# Patient Record
Sex: FEMALE | Race: WHITE | NOT HISPANIC OR LATINO
[De-identification: names, ages, dates, MRNs, and addresses within clinical notes are randomized per-mention and may not be internally consistent; named-entity substitution may affect disease eponyms.]

---

## 2018-02-13 PROBLEM — Z00.00 ENCOUNTER FOR PREVENTIVE HEALTH EXAMINATION: Status: ACTIVE | Noted: 2018-02-13

## 2018-02-16 ENCOUNTER — APPOINTMENT (OUTPATIENT)
Dept: HEMATOLOGY ONCOLOGY | Facility: CLINIC | Age: 83
End: 2018-02-16

## 2018-05-01 ENCOUNTER — APPOINTMENT (OUTPATIENT)
Dept: CARDIOLOGY | Facility: CLINIC | Age: 83
End: 2018-05-01

## 2018-05-04 ENCOUNTER — APPOINTMENT (OUTPATIENT)
Dept: CARDIOLOGY | Facility: CLINIC | Age: 83
End: 2018-05-04

## 2018-05-04 VITALS
WEIGHT: 108 LBS | HEART RATE: 66 BPM | DIASTOLIC BLOOD PRESSURE: 62 MMHG | HEIGHT: 55 IN | SYSTOLIC BLOOD PRESSURE: 163 MMHG | TEMPERATURE: 97.8 F | OXYGEN SATURATION: 99 % | BODY MASS INDEX: 24.99 KG/M2

## 2018-05-04 DIAGNOSIS — Z82.0 FAMILY HISTORY OF EPILEPSY AND OTHER DISEASES OF THE NERVOUS SYSTEM: ICD-10-CM

## 2018-05-04 DIAGNOSIS — Z85.79 PERSONAL HISTORY OF OTHER MALIGNANT NEOPLASMS OF LYMPHOID, HEMATOPOIETIC AND RELATED TISSUES: ICD-10-CM

## 2018-05-04 DIAGNOSIS — Z82.49 FAMILY HISTORY OF ISCHEMIC HEART DISEASE AND OTHER DISEASES OF THE CIRCULATORY SYSTEM: ICD-10-CM

## 2018-05-04 DIAGNOSIS — Z87.09 PERSONAL HISTORY OF OTHER DISEASES OF THE RESPIRATORY SYSTEM: ICD-10-CM

## 2018-05-04 DIAGNOSIS — Z87.19 PERSONAL HISTORY OF OTHER DISEASES OF THE DIGESTIVE SYSTEM: ICD-10-CM

## 2018-05-04 DIAGNOSIS — Z87.2 PERSONAL HISTORY OF DISEASES OF THE SKIN AND SUBCUTANEOUS TISSUE: ICD-10-CM

## 2018-05-04 DIAGNOSIS — Z85.3 PERSONAL HISTORY OF MALIGNANT NEOPLASM OF BREAST: ICD-10-CM

## 2018-05-04 DIAGNOSIS — Z87.01 PERSONAL HISTORY OF PNEUMONIA (RECURRENT): ICD-10-CM

## 2018-05-04 DIAGNOSIS — Z87.898 PERSONAL HISTORY OF OTHER SPECIFIED CONDITIONS: ICD-10-CM

## 2018-05-04 DIAGNOSIS — Z86.2 PERSONAL HISTORY OF DISEASES OF THE BLOOD AND BLOOD-FORMING ORGANS AND CERTAIN DISORDERS INVOLVING THE IMMUNE MECHANISM: ICD-10-CM

## 2018-05-04 DIAGNOSIS — Z86.718 PERSONAL HISTORY OF OTHER VENOUS THROMBOSIS AND EMBOLISM: ICD-10-CM

## 2018-05-23 PROBLEM — Z85.3 HISTORY OF MALIGNANT NEOPLASM OF BREAST: Status: RESOLVED | Noted: 2018-05-23 | Resolved: 2018-05-23

## 2018-05-23 PROBLEM — Z87.19 HISTORY OF HIATAL HERNIA: Status: RESOLVED | Noted: 2018-05-23 | Resolved: 2018-05-23

## 2018-05-23 PROBLEM — Z86.718 HISTORY OF DEEP VENOUS THROMBOSIS: Status: RESOLVED | Noted: 2018-05-23 | Resolved: 2018-05-23

## 2018-05-23 PROBLEM — Z87.09 HISTORY OF VOCAL CORD PARALYSIS: Status: RESOLVED | Noted: 2018-05-23 | Resolved: 2018-05-23

## 2018-05-23 PROBLEM — Z87.01 HISTORY OF PNEUMONIA: Status: RESOLVED | Noted: 2018-05-23 | Resolved: 2018-05-23

## 2018-05-23 PROBLEM — Z82.49 FAMILY HISTORY OF ACUTE MYOCARDIAL INFARCTION: Status: ACTIVE | Noted: 2018-05-23

## 2018-05-23 PROBLEM — Z87.2 HISTORY OF CELLULITIS: Status: RESOLVED | Noted: 2018-05-23 | Resolved: 2018-05-23

## 2018-05-23 PROBLEM — Z87.898 HISTORY OF SYNCOPE: Status: RESOLVED | Noted: 2018-05-23 | Resolved: 2018-05-23

## 2018-05-23 PROBLEM — Z82.0 FAMILY HISTORY OF CEREBRAL HEMORRHAGE: Status: ACTIVE | Noted: 2018-05-23

## 2018-05-23 PROBLEM — Z87.09 HISTORY OF RESTRICTIVE LUNG DISEASE: Status: RESOLVED | Noted: 2018-05-23 | Resolved: 2018-05-23

## 2018-05-23 PROBLEM — Z87.19 HISTORY OF DIVERTICULITIS OF COLON: Status: RESOLVED | Noted: 2018-05-23 | Resolved: 2018-05-23

## 2018-05-23 PROBLEM — Z86.2 HISTORY OF MYELODYSPLASTIC SYNDROME: Status: RESOLVED | Noted: 2018-05-23 | Resolved: 2018-05-23

## 2018-05-23 PROBLEM — Z85.79 HISTORY OF LYMPHOMA: Status: RESOLVED | Noted: 2018-05-23 | Resolved: 2018-05-23

## 2018-05-23 PROBLEM — Z82.49 FAMILY HISTORY OF CORONARY ARTERY DISEASE: Status: ACTIVE | Noted: 2018-05-23

## 2018-05-23 RX ORDER — CHOLECALCIFEROL (VITAMIN D3) 25 MCG
TABLET ORAL
Refills: 0 | Status: ACTIVE | COMMUNITY

## 2018-11-02 ENCOUNTER — RECORD ABSTRACTING (OUTPATIENT)
Age: 83
End: 2018-11-02

## 2019-01-07 ENCOUNTER — APPOINTMENT (OUTPATIENT)
Dept: CARDIOLOGY | Facility: CLINIC | Age: 84
End: 2019-01-07
Payer: MEDICARE

## 2019-01-07 VITALS
BODY MASS INDEX: 23.47 KG/M2 | WEIGHT: 101 LBS | HEART RATE: 62 BPM | SYSTOLIC BLOOD PRESSURE: 130 MMHG | DIASTOLIC BLOOD PRESSURE: 78 MMHG | OXYGEN SATURATION: 100 %

## 2019-01-07 DIAGNOSIS — Z87.39 PERSONAL HISTORY OF OTHER DISEASES OF THE MUSCULOSKELETAL SYSTEM AND CONNECTIVE TISSUE: ICD-10-CM

## 2019-01-07 PROCEDURE — 99214 OFFICE O/P EST MOD 30 MIN: CPT

## 2019-01-07 PROCEDURE — 93000 ELECTROCARDIOGRAM COMPLETE: CPT

## 2019-01-11 ENCOUNTER — NON-APPOINTMENT (OUTPATIENT)
Age: 84
End: 2019-01-11

## 2019-01-11 PROBLEM — Z87.39 HISTORY OF KYPHOSCOLIOSIS: Status: RESOLVED | Noted: 2019-01-11 | Resolved: 2019-01-11

## 2019-01-11 NOTE — REVIEW OF SYSTEMS
[Feeling Fatigued] : feeling fatigued [Eyeglasses] : currently wearing eyeglasses [see HPI] : see HPI [Shortness Of Breath] : shortness of breath [Joint Pain] : joint pain [Negative] : Heme/Lymph

## 2019-01-11 NOTE — DISCUSSION/SUMMARY
[FreeTextEntry1] : Atrial fibrillation\par The working diagnosis is paroxysmal  atrial fibrillation secondary to hypertensive- valvular (mild-moderate mitral regurgitation 3/18 echocardiogram) and (probable) atherosclerotic heart disease. Adequate rate control of the ventricular response is  achieved with the present medical regimen. An elevated "[id number]" score is indicative of an increased risk of systemic/cerebral emboli. Vitamin K antagonists therapy is indicated with a target INR of 2-3.\par \par I have recommended the following:\par 1. Continue present medical regimen\par 2. Target INR 2-3 as discussed above\par 3. 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 The history is consistent with this diagnosis. The differential diagnosis would include poorly venous congestion secondary to diastolic dysfunction. However the 3/18 chest x-ray and physical examination are not suggestive of this diagnosis.\par \par I recommended the following:\par 1. Consideration for pulmonary function studies if not recently performed.\par \par \par Valvular heart disease\par The 3/18 echocardiogram demonstrated marked calcification the mitral valve annulus with thickened leaflets. An echo density was seen somewhat mobile near the annulus and posterior leaflet. The possibility of a vegetation could not be ruled out. Mild-moderate mitral regurgitation was noted. The left atrium was enlarged at 4.9 cm. The left ventricular ejection fraction was 61%. The present cardiac physical examination is not suggestive of severe mitral regurgitation. In view of the absence of heart failure and clinical course continued monitoring without intervention is indicated at this time.\par \par I have recommended the following:\par 1. No further cardiac testing for this problem at this time.\par \par \par Hypertension\par Hypertension is controlled on the present medical regimen. In the setting of atrial fibrillation and probable atherosclerotic  heart disease beta blocker and ACE-I./ARB therapy are attractive. The dose of quinapril may be increased if required to maintain optimum levels.\par \par I have recommended the following:\par 1. Low salt low fat diet. Exercise to the extent possible\par 2 continue the present medical regimen\par 3 increase quinapril dose of required to maintain optimum levels as discussed above.

## 2019-01-11 NOTE — HISTORY OF PRESENT ILLNESS
[FreeTextEntry1] : 89-year-old female\par Routine followup\par "I feel okay." Lorene denies symptoms of chest pain, palpitations or syncope. Dyspnea on exertion is unchanged from in the past. Mrs. Whitlock complains primarily of back pain.\par \par Lorene is accompanied today by her daughter

## 2019-07-08 ENCOUNTER — APPOINTMENT (OUTPATIENT)
Dept: CARDIOLOGY | Facility: CLINIC | Age: 84
End: 2019-07-08

## 2019-07-22 ENCOUNTER — APPOINTMENT (OUTPATIENT)
Dept: INTERNAL MEDICINE | Facility: CLINIC | Age: 84
End: 2019-07-22
Payer: MEDICARE

## 2019-07-22 VITALS
BODY MASS INDEX: 23.14 KG/M2 | SYSTOLIC BLOOD PRESSURE: 138 MMHG | HEIGHT: 55 IN | DIASTOLIC BLOOD PRESSURE: 66 MMHG | WEIGHT: 100 LBS

## 2019-07-22 DIAGNOSIS — Z87.898 PERSONAL HISTORY OF OTHER SPECIFIED CONDITIONS: ICD-10-CM

## 2019-07-22 DIAGNOSIS — Z86.79 PERSONAL HISTORY OF OTHER DISEASES OF THE CIRCULATORY SYSTEM: ICD-10-CM

## 2019-07-22 LAB
INR PPP: 2.8 RATIO
POCT-PROTHROMBIN TIME: 33.8 SECS

## 2019-07-22 PROCEDURE — 99204 OFFICE O/P NEW MOD 45 MIN: CPT

## 2019-07-22 PROCEDURE — 85610 PROTHROMBIN TIME: CPT | Mod: QW

## 2019-07-22 RX ORDER — WARFARIN 4 MG/1
TABLET ORAL
Refills: 0 | Status: DISCONTINUED | COMMUNITY
End: 2019-07-22

## 2019-07-22 NOTE — HEALTH RISK ASSESSMENT
[Good] : ~his/her~ current health as good [No] : In the past 12 months have you used drugs other than those required for medical reasons? No [Never (0 pts)] : Never (0 points) [No falls in past year] : Patient reported no falls in the past year [0] : 2) Feeling down, depressed, or hopeless: Not at all (0) [Hepatitis C test declined] : Hepatitis C test declined [HIV test declined] : HIV test declined [] :  [Feels Safe at Home] : Feels safe at home [Independent] : managing medications [Some assistance needed] : managing finances [Patient declined mammogram] : Patient declined mammogram [Patient declined PAP Smear] : Patient declined PAP Smear [Patient reported bone density results were abnormal] : Patient reported bone density results were abnormal [Patient declined colonoscopy] : Patient declined colonoscopy [None] : None [Alone] : lives alone [Retired] : retired [] : No [de-identified] : none [de-identified] : none [PapSmearComments] : no longer gets [BoneDensityDate] : 02/13 [MammogramComments] : no longer gets [de-identified] : has an aide and her family close by [BoneDensityComments] : osteoporosis [ColonoscopyComments] : no longer gets

## 2019-07-22 NOTE — PHYSICAL EXAM
[No Acute Distress] : no acute distress [Well Nourished] : well nourished [Well Developed] : well developed [Well-Appearing] : well-appearing [Normal Oropharynx] : the oropharynx was normal [No JVD] : no jugular venous distention [Supple] : supple [Normal] : no respiratory distress, lungs were clear to auscultation bilaterally and no accessory muscle use [Normal Affect] : the affect was normal [Normal Insight/Judgement] : insight and judgment were intact [Normal Rate] : normal rate  [Normal S1, S2] : normal S1 and S2 [Kyphosis] : kyphosis [No Rash] : no rash [No Focal Deficits] : no focal deficits [Normal Mood] : the mood was normal [de-identified] : LLE edema around ankle [de-identified] : irreg, ? diastolic murmur I/VI [de-identified] : walks with walker [de-identified] : many moles and skin lesions

## 2019-07-22 NOTE — HISTORY OF PRESENT ILLNESS
[FreeTextEntry1] : establish care\par follow up chronic conditions [de-identified] : Pt here for first time visit. She feels well today. She lives at home alone with an aide from 8AM- 12PM who helps her with bathing and house hold maintenance. . She walks with a walker when out of the house. She no longer drives. Her daughter lives close to her and does her grocery shopping. Pt does her own meal prep. She prepares easy microwavable meals.\par Pt with no specific complaints today.\par She needs to have INR checked. She used to follow with Dr Oral Ribeiro once monthly for INR check.

## 2019-07-22 NOTE — REVIEW OF SYSTEMS
[Hearing Loss] : hearing loss [Lower Ext Edema] : lower extremity edema [Negative] : Respiratory [Chest Pain] : no chest pain [Sore Throat] : no sore throat [Fever] : no fever [Easy Bleeding] : no easy bleeding [Easy Bruising] : no easy bruising [de-identified] : intermittent aches

## 2019-07-23 RX ORDER — ALENDRONATE SODIUM 70 MG/1
70 TABLET ORAL
Refills: 0 | Status: DISCONTINUED | COMMUNITY
End: 2019-07-23

## 2019-08-01 ENCOUNTER — APPOINTMENT (OUTPATIENT)
Dept: CARDIOLOGY | Facility: CLINIC | Age: 84
End: 2019-08-01

## 2019-08-09 ENCOUNTER — RX RENEWAL (OUTPATIENT)
Age: 84
End: 2019-08-09

## 2019-08-12 ENCOUNTER — RX RENEWAL (OUTPATIENT)
Age: 84
End: 2019-08-12

## 2019-08-13 ENCOUNTER — RX RENEWAL (OUTPATIENT)
Age: 84
End: 2019-08-13

## 2019-08-19 ENCOUNTER — RX RENEWAL (OUTPATIENT)
Age: 84
End: 2019-08-19

## 2019-08-26 ENCOUNTER — APPOINTMENT (OUTPATIENT)
Dept: CARDIOLOGY | Facility: CLINIC | Age: 84
End: 2019-08-26
Payer: MEDICARE

## 2019-08-26 ENCOUNTER — NON-APPOINTMENT (OUTPATIENT)
Age: 84
End: 2019-08-26

## 2019-08-26 VITALS
HEART RATE: 54 BPM | BODY MASS INDEX: 22.78 KG/M2 | OXYGEN SATURATION: 100 % | WEIGHT: 98 LBS | SYSTOLIC BLOOD PRESSURE: 160 MMHG | DIASTOLIC BLOOD PRESSURE: 84 MMHG

## 2019-08-26 LAB — INR PPP: 3.6 RATIO

## 2019-08-26 PROCEDURE — 85610 PROTHROMBIN TIME: CPT | Mod: QW

## 2019-08-26 PROCEDURE — 93000 ELECTROCARDIOGRAM COMPLETE: CPT

## 2019-08-26 PROCEDURE — 99215 OFFICE O/P EST HI 40 MIN: CPT

## 2019-08-27 NOTE — DISCUSSION/SUMMARY
[FreeTextEntry1] : Atrial fibrillation\par The working diagnosis is chronic  atrial fibrillation secondary to hypertensive- valvular (mild-moderate mitral regurgitation 3/18 echocardiogram) and (probable) atherosclerotic heart disease. Adequate rate control of the ventricular response is  achieved with the present medical regimen. An elevated "[id number]" score is indicative of an increased risk of systemic/cerebral emboli. Vitamin K antagonists therapy is indicated with a target INR of 2-3.\par \par I have recommended the following:\par 1. Continue present medical regimen\par 2. Target INR 2-3 as discussed above\par 3. 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 The history is consistent with this diagnosis. The differential diagnosis would include pulmonary  venous congestion secondary to diastolic dysfunction. However the 3/18 chest x-ray and physical examination are not suggestive of this diagnosis.\par \par I  have recommended the following:\par 1. Consideration for pulmonary function studies if not recently performed.\par \par \par Valvular heart disease\par The 3/18 echocardiogram demonstrated marked calcification the mitral valve annulus with thickened leaflets. An echo density was seen somewhat mobile near the annulus and posterior leaflet. The possibility of a vegetation could not be ruled out. Mild-moderate mitral regurgitation was noted. The left atrium was enlarged at 4.9 cm. The left ventricular ejection fraction was 61%. The present cardiac physical examination is not suggestive of severe mitral regurgitation. In view of the absence of heart failure and clinical course continued monitoring without intervention is indicated at this time.\par \par I have recommended the following:\par 1. No further cardiac testing for this problem at this time.\par \par \par \par \par Hypertension\par Hypertension is controlled on the present medical regimen. In the setting of atrial fibrillation and probable atherosclerotic  heart disease beta blocker and ACE-I./ARB therapy are attractive. The dose of quinapril may be increased if required to maintain optimum levels.\par \par I have recommended the following:\par 1. Low salt low fat diet. Exercise to the extent possible\par 2 continue the present medical regimen\par 3 increase quinapril dose of required to maintain optimum levels as discussed above.\par \par \par \par \par Hyperlipidemia\par Hyperlipidemia represents a risk factor for the development of atherosclerotic heart disease. Although likely to be present, there is no objective evidence of such to date. An 8/05 Persantine sestamibi study was normal with an ejection fraction of 71%. The target LDL level for primary prevention is about 100. Medical intervention in this patient's age group is controversial. The most recent lipid levels are not available for review. The dose of atorvastatin may be increased if required to obtain optimal levels. Nonpharmacological therapy, specifically diet and exercise to the extent possible are emphasized as  major aspects of treatment.\par \par I have recommended the following:\par 1. Low-salt low-fat diet. Exercise to the extent possible\par 2 continued present medical regimen\par 3. Laboratory studies including lipid profile through primary care Dr. Kerns\par 4. Target LDL level to about 100 as discussed above\par 5. Increase atorvastatin dosage required to obtain optimum levels as noted above.

## 2019-08-27 NOTE — PHYSICAL EXAM
[Normal Conjunctiva] : the conjunctiva exhibited no abnormalities [Auscultation Breath Sounds / Voice Sounds] : lungs were clear to auscultation bilaterally [Heart Sounds] : normal S1 and S2 [Bowel Sounds] : normal bowel sounds [Abdomen Soft] : soft [Abdomen Tenderness] : non-tender [Abnormal Walk] : normal gait [Cyanosis, Localized] : no localized cyanosis [] : no rash [Affect] : the affect was normal [FreeTextEntry1] : pleasant

## 2019-08-27 NOTE — HISTORY OF PRESENT ILLNESS
[FreeTextEntry1] : 89-year-old female\par Routine followup\par "I feel okay."  Mrs. Whitlock denies chest pain, palpitations, shortness of breath at rest or syncope.\par Dyspnea on exertion is unchanged from in the past.

## 2019-09-05 ENCOUNTER — RX RENEWAL (OUTPATIENT)
Age: 84
End: 2019-09-05

## 2019-09-19 ENCOUNTER — APPOINTMENT (OUTPATIENT)
Dept: INTERNAL MEDICINE | Facility: CLINIC | Age: 84
End: 2019-09-19
Payer: MEDICARE

## 2019-09-19 VITALS
BODY MASS INDEX: 22.68 KG/M2 | SYSTOLIC BLOOD PRESSURE: 124 MMHG | DIASTOLIC BLOOD PRESSURE: 68 MMHG | WEIGHT: 98 LBS | HEIGHT: 55 IN

## 2019-09-19 LAB — POCT-PROTHROMBIN TIME: 5.3 SECS

## 2019-09-19 PROCEDURE — 85610 PROTHROMBIN TIME: CPT | Mod: QW

## 2019-09-19 PROCEDURE — 99213 OFFICE O/P EST LOW 20 MIN: CPT

## 2019-09-19 NOTE — PHYSICAL EXAM
[Normal] : no respiratory distress, lungs were clear to auscultation bilaterally and no accessory muscle use [Normal Rate] : normal rate  [Normal Affect] : the affect was normal [Alert and Oriented x3] : oriented to person, place, and time [Normal Insight/Judgement] : insight and judgment were intact [de-identified] : irreg, possible soft systolic murmur only heard when she holds her breath.

## 2019-09-23 ENCOUNTER — APPOINTMENT (OUTPATIENT)
Dept: INTERNAL MEDICINE | Facility: CLINIC | Age: 84
End: 2019-09-23
Payer: MEDICARE

## 2019-09-25 ENCOUNTER — RX RENEWAL (OUTPATIENT)
Age: 84
End: 2019-09-25

## 2019-09-25 ENCOUNTER — APPOINTMENT (OUTPATIENT)
Dept: INTERNAL MEDICINE | Facility: CLINIC | Age: 84
End: 2019-09-25
Payer: MEDICARE

## 2019-09-25 VITALS
BODY MASS INDEX: 22.68 KG/M2 | WEIGHT: 98 LBS | SYSTOLIC BLOOD PRESSURE: 132 MMHG | DIASTOLIC BLOOD PRESSURE: 70 MMHG | HEIGHT: 55 IN

## 2019-09-25 LAB — POCT-PROTHROMBIN TIME: 5.8 SECS

## 2019-09-25 PROCEDURE — 99213 OFFICE O/P EST LOW 20 MIN: CPT

## 2019-09-25 PROCEDURE — 85610 PROTHROMBIN TIME: CPT | Mod: QW

## 2019-09-25 NOTE — REVIEW OF SYSTEMS
[Easy Bruising] : easy bruising [Negative] : Respiratory [Fever] : no fever [Easy Bleeding] : no easy bleeding

## 2019-09-25 NOTE — HEALTH RISK ASSESSMENT
[No] : In the past 12 months have you used drugs other than those required for medical reasons? No [No falls in past year] : Patient reported no falls in the past year [Assistive Device] : Patient uses an assistive device [0] : 2) Feeling down, depressed, or hopeless: Not at all (0) [] : No [de-identified] : none [de-identified] : none [de-identified] : luis carlos

## 2019-09-25 NOTE — PHYSICAL EXAM
[EOMI] : extraocular movements intact [Normal] : no respiratory distress, lungs were clear to auscultation bilaterally and no accessory muscle use [Normal Rate] : normal rate  [Normal Affect] : the affect was normal [Alert and Oriented x3] : oriented to person, place, and time [Normal Insight/Judgement] : insight and judgment were intact [de-identified] : mild injection sclera left eye [de-identified] : irreg, Soft systolic murmur [de-identified] : canker sore on hard palate [de-identified] : very thin skin on forearms

## 2019-09-25 NOTE — HISTORY OF PRESENT ILLNESS
[Formal Caregiver] : formal caregiver [FreeTextEntry1] : INR  [de-identified] : Pt was here last week 9/19/19 and her INR was high. She held the coumadin for 2 days and then restarted at her usual dose of 2.5mg every day. She is once again high. \par No bleeding or new bruising. \par Pt says she woke up with her left eye irritated. It is slightly injected here.\par Also has a canker sore on the roof of her mouth.

## 2019-09-30 ENCOUNTER — APPOINTMENT (OUTPATIENT)
Dept: INTERNAL MEDICINE | Facility: CLINIC | Age: 84
End: 2019-09-30
Payer: MEDICARE

## 2019-09-30 LAB — POCT-PROTHROMBIN TIME: 2.2 SECS

## 2019-09-30 PROCEDURE — 85610 PROTHROMBIN TIME: CPT | Mod: QW

## 2019-10-09 ENCOUNTER — RX RENEWAL (OUTPATIENT)
Age: 84
End: 2019-10-09

## 2019-10-14 ENCOUNTER — LABORATORY RESULT (OUTPATIENT)
Age: 84
End: 2019-10-14

## 2019-10-14 ENCOUNTER — APPOINTMENT (OUTPATIENT)
Dept: INTERNAL MEDICINE | Facility: CLINIC | Age: 84
End: 2019-10-14
Payer: MEDICARE

## 2019-10-14 VITALS
DIASTOLIC BLOOD PRESSURE: 70 MMHG | SYSTOLIC BLOOD PRESSURE: 132 MMHG | WEIGHT: 98 LBS | BODY MASS INDEX: 22.68 KG/M2 | HEIGHT: 55 IN

## 2019-10-14 DIAGNOSIS — K12.0 RECURRENT ORAL APHTHAE: ICD-10-CM

## 2019-10-14 DIAGNOSIS — H57.89 OTHER SPECIFIED DISORDERS OF EYE AND ADNEXA: ICD-10-CM

## 2019-10-14 LAB — POCT-PROTHROMBIN TIME: 1.9 SECS

## 2019-10-14 PROCEDURE — 99214 OFFICE O/P EST MOD 30 MIN: CPT | Mod: 25

## 2019-10-14 PROCEDURE — 85610 PROTHROMBIN TIME: CPT | Mod: QW

## 2019-10-14 PROCEDURE — 36415 COLL VENOUS BLD VENIPUNCTURE: CPT

## 2019-10-14 NOTE — PHYSICAL EXAM
[No Acute Distress] : no acute distress [Normal] : no respiratory distress, lungs were clear to auscultation bilaterally and no accessory muscle use [Normal Rate] : normal rate  [Normal S1, S2] : normal S1 and S2 [Coordination Grossly Intact] : coordination grossly intact [Alert and Oriented x3] : oriented to person, place, and time [Normal Affect] : the affect was normal [Normal Insight/Judgement] : insight and judgment were intact [de-identified] : many moles and skin lesions [de-identified] : irreg [de-identified] : walks with walker

## 2019-10-14 NOTE — HISTORY OF PRESENT ILLNESS
[Formal Caregiver] : formal caregiver [FreeTextEntry1] : check INR,\par follow up visit for chronic conditions\par  [de-identified] : Pt here for f/u visit. She feels well overall. No bleeding for unusual bruising. No chest pains or SOB. Her INR has been supratherapeutic on 2.5mg coumadin. She has been taking 2mg daily since last visit. \par She is here today with her aide and seen walking with her walker.

## 2019-10-15 LAB
ALBUMIN SERPL ELPH-MCNC: 3.4 G/DL
ALP BLD-CCNC: 74 U/L
ALT SERPL-CCNC: 7 U/L
ANION GAP SERPL CALC-SCNC: 11 MMOL/L
AST SERPL-CCNC: 16 U/L
BASOPHILS # BLD AUTO: 0.02 K/UL
BASOPHILS NFR BLD AUTO: 1.2 %
BILIRUB SERPL-MCNC: 0.4 MG/DL
BUN SERPL-MCNC: 27 MG/DL
CALCIUM SERPL-MCNC: 9.3 MG/DL
CHLORIDE SERPL-SCNC: 103 MMOL/L
CHOLEST SERPL-MCNC: 112 MG/DL
CHOLEST/HDLC SERPL: 2 RATIO
CO2 SERPL-SCNC: 24 MMOL/L
CREAT SERPL-MCNC: 1.45 MG/DL
EOSINOPHIL # BLD AUTO: 0.06 K/UL
EOSINOPHIL NFR BLD AUTO: 3.5 %
GLUCOSE SERPL-MCNC: 77 MG/DL
HCT VFR BLD CALC: 32.2 %
HDLC SERPL-MCNC: 57 MG/DL
HGB BLD-MCNC: 9.8 G/DL
IMM GRANULOCYTES NFR BLD AUTO: 0.6 %
LDLC SERPL CALC-MCNC: 44 MG/DL
LYMPHOCYTES # BLD AUTO: 1.06 K/UL
LYMPHOCYTES NFR BLD AUTO: 62.4 %
MAN DIFF?: NORMAL
MCHC RBC-ENTMCNC: 28.2 PG
MCHC RBC-ENTMCNC: 30.4 GM/DL
MCV RBC AUTO: 92.8 FL
MONOCYTES # BLD AUTO: 0.23 K/UL
MONOCYTES NFR BLD AUTO: 13.5 %
NEUTROPHILS # BLD AUTO: 0.32 K/UL
NEUTROPHILS NFR BLD AUTO: 18.8 %
PLATELET # BLD AUTO: NORMAL K/UL
POTASSIUM SERPL-SCNC: 4.8 MMOL/L
PROT SERPL-MCNC: 7.1 G/DL
RBC # BLD: 3.47 M/UL
RBC # FLD: 14.8 %
SODIUM SERPL-SCNC: 138 MMOL/L
T4 FREE SERPL-MCNC: 1.5 NG/DL
TRIGL SERPL-MCNC: 56 MG/DL
TSH SERPL-ACNC: 0.43 UIU/ML
WBC # FLD AUTO: 1.7 K/UL

## 2019-11-14 ENCOUNTER — APPOINTMENT (OUTPATIENT)
Dept: INTERNAL MEDICINE | Facility: CLINIC | Age: 84
End: 2019-11-14
Payer: MEDICARE

## 2019-11-15 ENCOUNTER — APPOINTMENT (OUTPATIENT)
Dept: INTERNAL MEDICINE | Facility: CLINIC | Age: 84
End: 2019-11-15
Payer: MEDICARE

## 2019-11-15 VITALS
WEIGHT: 98 LBS | BODY MASS INDEX: 22.68 KG/M2 | SYSTOLIC BLOOD PRESSURE: 126 MMHG | DIASTOLIC BLOOD PRESSURE: 78 MMHG | HEIGHT: 55 IN

## 2019-11-15 LAB — POCT-PROTHROMBIN TIME: 4.7 SECS

## 2019-11-15 PROCEDURE — 99214 OFFICE O/P EST MOD 30 MIN: CPT | Mod: 25

## 2019-11-15 PROCEDURE — 85610 PROTHROMBIN TIME: CPT | Mod: QW

## 2019-11-15 NOTE — HEALTH RISK ASSESSMENT
[No] : No [No falls in past year] : Patient reported no falls in the past year [Assistive Device] : Patient uses an assistive device [0] : 1) Little interest or pleasure doing things: Not at all (0) [] : No [de-identified] : none [de-identified] : none [de-identified] : luis carlos

## 2019-11-15 NOTE — HISTORY OF PRESENT ILLNESS
[Formal Caregiver] : formal caregiver [FreeTextEntry1] : follow-up INR\par Pt takes Coumadin 2mg for 6days and 2.5mg for 1day [de-identified] : Pt states that she has been on Coumadin since 2001.

## 2019-11-15 NOTE — ASSESSMENT
[FreeTextEntry1] : Time spent with pt: 25 minutes. I have spoken to her aide and pt in detail trying to go through her meds to ensure she is taking is as prescribed.

## 2019-11-15 NOTE — REVIEW OF SYSTEMS
[Fever] : no fever [Shortness Of Breath] : no shortness of breath [Chest Pain] : no chest pain [Easy Bleeding] : no easy bleeding [Easy Bruising] : no easy bruising [Swollen Glands] : no swollen glands [de-identified] : many skin lesions

## 2019-11-15 NOTE — PHYSICAL EXAM
[No Acute Distress] : no acute distress [Normal] : no respiratory distress, lungs were clear to auscultation bilaterally and no accessory muscle use [Normal S1, S2] : normal S1 and S2 [Normal Rate] : normal rate  [Normal Affect] : the affect was normal [Coordination Grossly Intact] : coordination grossly intact [Alert and Oriented x3] : oriented to person, place, and time [Normal Insight/Judgement] : insight and judgment were intact [de-identified] : irreg, distant heart sounds, murmur not appreciated [de-identified] : many moles and skin lesions [de-identified] : walks with walker

## 2019-11-22 ENCOUNTER — APPOINTMENT (OUTPATIENT)
Dept: INTERNAL MEDICINE | Facility: CLINIC | Age: 84
End: 2019-11-22
Payer: MEDICARE

## 2019-11-22 VITALS
DIASTOLIC BLOOD PRESSURE: 60 MMHG | SYSTOLIC BLOOD PRESSURE: 100 MMHG | WEIGHT: 98 LBS | HEIGHT: 55 IN | BODY MASS INDEX: 22.68 KG/M2

## 2019-11-22 PROCEDURE — 36415 COLL VENOUS BLD VENIPUNCTURE: CPT

## 2019-11-22 PROCEDURE — 90662 IIV NO PRSV INCREASED AG IM: CPT

## 2019-11-22 PROCEDURE — G0008: CPT

## 2019-11-22 RX ORDER — TRAMADOL HYDROCHLORIDE 50 MG/1
50 TABLET, COATED ORAL
Qty: 60 | Refills: 0 | Status: DISCONTINUED | COMMUNITY
Start: 2019-10-30 | End: 2019-11-22

## 2019-11-23 ENCOUNTER — TRANSCRIPTION ENCOUNTER (OUTPATIENT)
Age: 84
End: 2019-11-23

## 2019-11-25 LAB
INR PPP: 1.08 RATIO
PT BLD: 12.4 SEC

## 2019-12-05 ENCOUNTER — APPOINTMENT (OUTPATIENT)
Dept: INTERNAL MEDICINE | Facility: CLINIC | Age: 84
End: 2019-12-05
Payer: MEDICARE

## 2019-12-05 VITALS
DIASTOLIC BLOOD PRESSURE: 66 MMHG | WEIGHT: 98 LBS | BODY MASS INDEX: 22.68 KG/M2 | SYSTOLIC BLOOD PRESSURE: 112 MMHG | HEIGHT: 55 IN

## 2019-12-05 LAB — POCT-PROTHROMBIN TIME: 1.3 SECS

## 2019-12-05 PROCEDURE — 99213 OFFICE O/P EST LOW 20 MIN: CPT | Mod: 25

## 2019-12-05 PROCEDURE — 85610 PROTHROMBIN TIME: CPT | Mod: QW

## 2019-12-05 NOTE — PHYSICAL EXAM
[Normal] : no respiratory distress, lungs were clear to auscultation bilaterally and no accessory muscle use [No Acute Distress] : no acute distress [Coordination Grossly Intact] : coordination grossly intact [Normal Rate] : normal rate  [Normal S1, S2] : normal S1 and S2 [Alert and Oriented x3] : oriented to person, place, and time [Normal Affect] : the affect was normal [Normal Insight/Judgement] : insight and judgment were intact [de-identified] : irreg, distant heart sounds, murmur not appreciated [de-identified] : many moles and skin lesions [de-identified] : walks with walker

## 2019-12-05 NOTE — HISTORY OF PRESENT ILLNESS
[Formal Caregiver] : formal caregiver [FreeTextEntry1] : follow-up INR [de-identified] : Pt here for INR check. She has been taking couamdin 1mg alternating with 2mg. However her INR today is only 1.3. When she was taking 2mg 6 days per week and 2.5mg one day a week, it was 5.0. We have been having a very hard time controlling her INR. She has no abnormal bleeding or bruising.

## 2019-12-05 NOTE — HEALTH RISK ASSESSMENT
[No] : In the past 12 months have you used drugs other than those required for medical reasons? No [No falls in past year] : Patient reported no falls in the past year [0] : 2) Feeling down, depressed, or hopeless: Not at all (0) [] : No [de-identified] : none [de-identified] : none

## 2019-12-19 ENCOUNTER — RX CHANGE (OUTPATIENT)
Age: 84
End: 2019-12-19

## 2019-12-19 RX ORDER — WARFARIN 2 MG/1
2 TABLET ORAL DAILY
Qty: 30 | Refills: 0 | Status: DISCONTINUED | COMMUNITY
Start: 1900-01-01 | End: 2019-12-19

## 2020-01-01 ENCOUNTER — LABORATORY RESULT (OUTPATIENT)
Age: 85
End: 2020-01-01

## 2020-01-01 ENCOUNTER — RX RENEWAL (OUTPATIENT)
Age: 85
End: 2020-01-01

## 2020-01-01 ENCOUNTER — APPOINTMENT (OUTPATIENT)
Dept: CARDIOLOGY | Facility: CLINIC | Age: 85
End: 2020-01-01
Payer: MEDICARE

## 2020-01-01 ENCOUNTER — APPOINTMENT (OUTPATIENT)
Dept: INTERNAL MEDICINE | Facility: CLINIC | Age: 85
End: 2020-01-01
Payer: MEDICARE

## 2020-01-01 ENCOUNTER — NON-APPOINTMENT (OUTPATIENT)
Age: 85
End: 2020-01-01

## 2020-01-01 VITALS
HEART RATE: 53 BPM | OXYGEN SATURATION: 96 % | BODY MASS INDEX: 22.31 KG/M2 | DIASTOLIC BLOOD PRESSURE: 70 MMHG | TEMPERATURE: 96.6 F | WEIGHT: 96 LBS | SYSTOLIC BLOOD PRESSURE: 138 MMHG

## 2020-01-01 VITALS
BODY MASS INDEX: 23.14 KG/M2 | SYSTOLIC BLOOD PRESSURE: 138 MMHG | DIASTOLIC BLOOD PRESSURE: 72 MMHG | WEIGHT: 100 LBS | HEIGHT: 55 IN | TEMPERATURE: 97.8 F

## 2020-01-01 VITALS
WEIGHT: 96 LBS | BODY MASS INDEX: 22.21 KG/M2 | SYSTOLIC BLOOD PRESSURE: 136 MMHG | TEMPERATURE: 97.5 F | HEIGHT: 55 IN | DIASTOLIC BLOOD PRESSURE: 64 MMHG

## 2020-01-01 DIAGNOSIS — Z87.19 PERSONAL HISTORY OF OTHER DISEASES OF THE DIGESTIVE SYSTEM: ICD-10-CM

## 2020-01-01 DIAGNOSIS — Z87.39 PERSONAL HISTORY OF OTHER DISEASES OF THE MUSCULOSKELETAL SYSTEM AND CONNECTIVE TISSUE: ICD-10-CM

## 2020-01-01 DIAGNOSIS — Z87.898 PERSONAL HISTORY OF OTHER SPECIFIED CONDITIONS: ICD-10-CM

## 2020-01-01 DIAGNOSIS — M54.5 LOW BACK PAIN: ICD-10-CM

## 2020-01-01 DIAGNOSIS — D61.818 OTHER PANCYTOPENIA: ICD-10-CM

## 2020-01-01 LAB
ALBUMIN SERPL ELPH-MCNC: 3.1 G/DL
ALP BLD-CCNC: 89 U/L
ALT SERPL-CCNC: 6 U/L
ANION GAP SERPL CALC-SCNC: 10 MMOL/L
AST SERPL-CCNC: 14 U/L
BACTERIA UR CULT: ABNORMAL
BASOPHILS # BLD AUTO: 0.02 K/UL
BASOPHILS NFR BLD AUTO: 1.4 %
BILIRUB SERPL-MCNC: 0.4 MG/DL
BUN SERPL-MCNC: 32 MG/DL
CALCIUM SERPL-MCNC: 8.9 MG/DL
CHLORIDE SERPL-SCNC: 102 MMOL/L
CHOLEST SERPL-MCNC: 114 MG/DL
CO2 SERPL-SCNC: 24 MMOL/L
CREAT SERPL-MCNC: 1.35 MG/DL
EOSINOPHIL # BLD AUTO: 0.02 K/UL
EOSINOPHIL NFR BLD AUTO: 1.4 %
GLUCOSE SERPL-MCNC: 171 MG/DL
HCT VFR BLD CALC: 32.1 %
HDLC SERPL-MCNC: 57 MG/DL
HGB BLD-MCNC: 9.9 G/DL
IMM GRANULOCYTES NFR BLD AUTO: 0 %
LDLC SERPL CALC-MCNC: 43 MG/DL
LYMPHOCYTES # BLD AUTO: 0.92 K/UL
LYMPHOCYTES NFR BLD AUTO: 66.7 %
MAN DIFF?: NORMAL
MCHC RBC-ENTMCNC: 27.3 PG
MCHC RBC-ENTMCNC: 30.8 GM/DL
MCV RBC AUTO: 88.7 FL
MONOCYTES # BLD AUTO: 0.21 K/UL
MONOCYTES NFR BLD AUTO: 15.2 %
NEUTROPHILS # BLD AUTO: 0.21 K/UL
NEUTROPHILS NFR BLD AUTO: 15.3 %
NONHDLC SERPL-MCNC: 57 MG/DL
PLATELET # BLD AUTO: 75 K/UL
POTASSIUM SERPL-SCNC: 4.7 MMOL/L
PROT SERPL-MCNC: 6.9 G/DL
RBC # BLD: 3.62 M/UL
RBC # FLD: 15.9 %
SODIUM SERPL-SCNC: 135 MMOL/L
TRIGL SERPL-MCNC: 68 MG/DL
WBC # FLD AUTO: 1.38 K/UL

## 2020-01-01 PROCEDURE — 81002 URINALYSIS NONAUTO W/O SCOPE: CPT

## 2020-01-01 PROCEDURE — 90662 IIV NO PRSV INCREASED AG IM: CPT

## 2020-01-01 PROCEDURE — 99214 OFFICE O/P EST MOD 30 MIN: CPT

## 2020-01-01 PROCEDURE — 99213 OFFICE O/P EST LOW 20 MIN: CPT | Mod: 25

## 2020-01-01 PROCEDURE — 93000 ELECTROCARDIOGRAM COMPLETE: CPT

## 2020-01-01 PROCEDURE — 36415 COLL VENOUS BLD VENIPUNCTURE: CPT

## 2020-01-01 PROCEDURE — 99214 OFFICE O/P EST MOD 30 MIN: CPT | Mod: 25

## 2020-01-01 PROCEDURE — G0008: CPT

## 2020-01-01 RX ORDER — CIPROFLOXACIN HYDROCHLORIDE 250 MG/1
250 TABLET, FILM COATED ORAL
Qty: 10 | Refills: 0 | Status: DISCONTINUED | COMMUNITY
Start: 2020-01-01 | End: 2020-01-01

## 2020-01-09 ENCOUNTER — APPOINTMENT (OUTPATIENT)
Dept: INTERNAL MEDICINE | Facility: CLINIC | Age: 85
End: 2020-01-09
Payer: MEDICARE

## 2020-01-09 VITALS — HEIGHT: 55 IN | SYSTOLIC BLOOD PRESSURE: 118 MMHG | TEMPERATURE: 98.1 F | DIASTOLIC BLOOD PRESSURE: 72 MMHG

## 2020-01-09 PROCEDURE — 99214 OFFICE O/P EST MOD 30 MIN: CPT

## 2020-01-09 NOTE — HISTORY OF PRESENT ILLNESS
[___ Days ago] : [unfilled] days ago [Constant] : constant [Moderate] : moderate [Worsening] : worsening [Activity] : with activity [Formal Caregiver] : formal caregiver [FreeTextEntry8] : left hand edema\par Pt with left hand swelling and warmth since Monday. It has not been getting worse. It has stayed the same and there has been no spreading of the redness.  No fever. No falls, no injury. No pain. Pt is able to move her wrist. \par Pt recently started on Eliquis. no bruising noted in area of swelling.

## 2020-01-09 NOTE — HEALTH RISK ASSESSMENT
[No falls in past year] : Patient reported no falls in the past year [No] : In the past 12 months have you used drugs other than those required for medical reasons? No [Assistive Device] : Patient uses an assistive device [0] : 1) Little interest or pleasure doing things: Not at all (0) [de-identified] : none [] : No [de-identified] : none [de-identified] : luis carlos

## 2020-01-09 NOTE — PHYSICAL EXAM
[No Acute Distress] : no acute distress [Well Nourished] : well nourished [Well Developed] : well developed [No Accessory Muscle Use] : no accessory muscle use [No Respiratory Distress] : no respiratory distress  [Normal Rate] : normal rate  [Normal Affect] : the affect was normal [Normal Insight/Judgement] : insight and judgment were intact [de-identified] : right hand with swelling in ventral region close to wrist, slightly warm, non tender [de-identified] : irreg

## 2020-01-17 ENCOUNTER — APPOINTMENT (OUTPATIENT)
Dept: INTERNAL MEDICINE | Facility: CLINIC | Age: 85
End: 2020-01-17

## 2020-02-06 ENCOUNTER — APPOINTMENT (OUTPATIENT)
Dept: INTERNAL MEDICINE | Facility: CLINIC | Age: 85
End: 2020-02-06
Payer: MEDICARE

## 2020-02-06 VITALS — HEIGHT: 55 IN | SYSTOLIC BLOOD PRESSURE: 124 MMHG | DIASTOLIC BLOOD PRESSURE: 70 MMHG

## 2020-02-06 DIAGNOSIS — L03.114 CELLULITIS OF LEFT UPPER LIMB: ICD-10-CM

## 2020-02-06 DIAGNOSIS — K21.9 GASTRO-ESOPHAGEAL REFLUX DISEASE W/OUT ESOPHAGITIS: ICD-10-CM

## 2020-02-06 PROCEDURE — 99495 TRANSJ CARE MGMT MOD F2F 14D: CPT

## 2020-02-06 RX ORDER — CEPHALEXIN 250 MG/1
250 TABLET ORAL
Qty: 14 | Refills: 0 | Status: DISCONTINUED | COMMUNITY
Start: 2020-01-09 | End: 2020-02-06

## 2020-02-06 NOTE — PHYSICAL EXAM
[No Acute Distress] : no acute distress [Well Nourished] : well nourished [Well Developed] : well developed [Supple] : supple [No Accessory Muscle Use] : no accessory muscle use [No Respiratory Distress] : no respiratory distress  [Clear to Auscultation] : lungs were clear to auscultation bilaterally [Normal Rate] : normal rate  [No Focal Deficits] : no focal deficits [Normal Affect] : the affect was normal [Alert and Oriented x3] : oriented to person, place, and time [Normal Mood] : the mood was normal [Normal Insight/Judgement] : insight and judgment were intact [14681 - Moderate Complexity requires multiple possible diagnoses and/or the management options, moderate complexity of the medical data (tests, etc.) to be reviewed, and moderate risk of significant complications, morbidity, and/or mortality as well as co] : Moderate Complexity [Normal S1, S2] : normal S1 and S2 [No Murmur] : no murmur heard [No Edema] : there was no peripheral edema [de-identified] : irreg [de-identified] : b/l mastectomy scars [de-identified] : rand hand with 4th and 5th digits that are a bit contracted, she can lift both arms [de-identified] : thin skin, brusing on hands [de-identified] : seen walking with walker

## 2020-02-06 NOTE — REVIEW OF SYSTEMS
[Easy Bruising] : easy bruising [Negative] : Psychiatric [Fever] : no fever [Easy Bleeding] : no easy bleeding [Swollen Glands] : no swollen glands [FreeTextEntry9] : no pain but has issues with movement of her right hip.  [de-identified] : walks with walker,

## 2020-02-06 NOTE — HISTORY OF PRESENT ILLNESS
[Post-hospitalization from ___ Hospital] : Post-hospitalization from [unfilled] Hospital [Admitted on: ___] : The patient was admitted on [unfilled] [Discharged on ___] : discharged on [unfilled] [Discharge Summary] : discharge summary [Pertinent Labs] : pertinent labs [Radiology Findings] : radiology findings [Discharge Med List] : discharge medication list [Med Reconciliation] : medication reconciliation has been completed [Patient Contacted By: ____] : and contacted by [unfilled] [FreeTextEntry2] : Pt here for f/u visit from the hospital. She was admitted for generalized weakness. She was admitted for 3 days. She was d/c/d to home with a HHA. Pt seems to be doing well overall, but she needs home PT.  She needs a prescription for this. \par She currently has no pain. She says that the day she went to the ER, she was unable to get out of a chair due to decreased ROM of her right hip. \par pt has no dizziness, no confusion. No chest pains or SOB. \par Her daughter is trying to get 24 hour care. She currently has an aide during the day.

## 2020-02-06 NOTE — HEALTH RISK ASSESSMENT
[No] : In the past 12 months have you used drugs other than those required for medical reasons? No [No falls in past year] : Patient reported no falls in the past year [Assistive Device] : Patient uses an assistive device [0] : 2) Feeling down, depressed, or hopeless: Not at all (0) [] : No [de-identified] : none [de-identified] : none [de-identified] : luis carlos [HIV test declined] : HIV test declined [Hepatitis C test declined] : Hepatitis C test declined [None] : None [Alone] : lives alone [Employed] : employed [] :  [Feels Safe at Home] : Feels safe at home

## 2020-02-19 ENCOUNTER — APPOINTMENT (OUTPATIENT)
Dept: CARDIOLOGY | Facility: CLINIC | Age: 85
End: 2020-02-19
Payer: MEDICARE

## 2020-02-19 ENCOUNTER — NON-APPOINTMENT (OUTPATIENT)
Age: 85
End: 2020-02-19

## 2020-02-19 VITALS
SYSTOLIC BLOOD PRESSURE: 126 MMHG | WEIGHT: 96 LBS | OXYGEN SATURATION: 100 % | BODY MASS INDEX: 22.31 KG/M2 | HEART RATE: 65 BPM | DIASTOLIC BLOOD PRESSURE: 70 MMHG

## 2020-02-19 DIAGNOSIS — I34.0 NONRHEUMATIC MITRAL (VALVE) INSUFFICIENCY: ICD-10-CM

## 2020-02-19 PROCEDURE — 99215 OFFICE O/P EST HI 40 MIN: CPT

## 2020-02-19 PROCEDURE — 93000 ELECTROCARDIOGRAM COMPLETE: CPT

## 2020-02-21 NOTE — HISTORY OF PRESENT ILLNESS
[FreeTextEntry1] : 89-year-old female\par Routine followup\par "I feel okay "  Lorene  was hospitalized in 1/20 because of generalized weakness thought to be due to dehydration. Treatment with intravenous fluids and physical therapy resulted in clinical improvement. ( see hospital records.)\par Lorene presently  denies chest pain, palpitations, shortness of breath at rest or syncope. Dyspnea on exertion and diffuse weakness are  unchanged from in the past.\par Easy bruisability but no bleeding complications while taking apixaban.

## 2020-02-21 NOTE — DISCUSSION/SUMMARY
[FreeTextEntry1] : Atrial fibrillation\par The working diagnosis is paroxysmal   atrial fibrillation secondary to hypertensive- valvular (mild-moderate mitral regurgitation 3/18 echocardiogram) and (probable) atherosclerotic heart disease. Adequate rate control of the ventricular response is  achieved with the present medical regimen. An elevated "[id number]" score is indicative of an increased risk of systemic/cerebral emboli. Vitamin K antagonists therapy is indicated with a target INR of 2-3.\par \par I have recommended the following:\par 1. Continue present medical regimen\par 2. Target INR 2-3 as discussed above\par 3. No further cardiac testing for this problem at this time.\par 4. Echocardiogram with next office evaluation in 6 months\par \par \par \par \par \par Valvular heart disease\par The 3/18 echocardiogram demonstrated marked calcification the mitral valve annulus with thickened leaflets. An echo density was seen somewhat mobile near the annulus and posterior leaflet. The possibility of a vegetation could not be ruled out. Mild-moderate mitral regurgitation was noted. The left atrium was enlarged at 4.9 cm. The left ventricular ejection fraction was 61%. The present cardiac physical examination is not suggestive of severe mitral regurgitation. In view of the absence of heart failure and clinical course continued monitoring without intervention is indicated at this time.\par \par I have recommended the following:\par 1. No further cardiac testing for this problem at this time.\par 2 Echocardiogram with next office evaluation in 6 months\par \par \par \par \par Hypertension\par Hypertension is controlled on the present medical regimen. In the setting of atrial fibrillation and probable atherosclerotic  heart disease beta blocker and ACE-I./ARB therapy are attractive. The dose of quinapril may be increased if required to maintain optimum levels.\par \par I have recommended the following:\par 1. Low salt low fat diet. Exercise to the extent possible\par 2 continue the present medical regimen\par 3 increase quinapril dose if required to maintain optimum levels as discussed above.\par \par \par \par \par Hyperlipidemia\par Hyperlipidemia represents a risk factor for the development of atherosclerotic heart disease. Although likely to be present, there is no objective evidence of such to date. An 8/05 Persantine sestamibi study was normal with an ejection fraction of 71%. The target LDL level for primary prevention is about 100.HMG coA reductase inhibitor therapy has been effective In 10/19 the serum cholesterol level is 112 triglycerides 56 HDL 57 and LDL 44. Consideration will be given towards a reduction in dose of atorvastatin dependent on followup lipid levels.Medical intervention in this patient's age group is controversial.  Nonpharmacological therapy, specifically diet and exercise to the extent possible are emphasized as  major aspects of treatment.\par \par I have recommended the following:\par 1. Low-salt low-fat diet. Exercise to the extent possible\par 2 continued present medical regimen\par 3. Laboratory studies including lipid profile through primary care Dr. Kerns\par 4. Target LDL level to about 100 as discussed above\par 5.Decrease atorvastatin dose dependent on followup lipid levels as discussed above.\par \par \par \par The diagnosis, prognosis risks and options were reviewed with the patient. All questions were answered. Issues discussed included anticoagulation, atrial fibrillation risk of stroke hypertension and hyperlipidemia. An elevated risk regarding medical decision making in this patient encounter was in part related to anticoagulation. The possibility of bleeding complications versus embolic stroke who weighed carefully in this elderly patient. Treatment for atrial fibrillation with rate control strategy versus attempts at maintaining sinus rhythm were addressed.\par \par \par Counseling and/or coordination of care\par Time was a significant factor for this patient encounter. Total time spent with the patient was 40 minutes. 50% of the time was devoted to counseling and/or coordination of care.

## 2020-02-21 NOTE — PHYSICAL EXAM
[Normal Conjunctiva] : the conjunctiva exhibited no abnormalities [Auscultation Breath Sounds / Voice Sounds] : lungs were clear to auscultation bilaterally [Heart Sounds] : normal S1 and S2 [Bowel Sounds] : normal bowel sounds [Abdomen Soft] : soft [Abdomen Tenderness] : non-tender [Abnormal Walk] : normal gait [] : no rash [Affect] : the affect was normal [FreeTextEntry1] : pleasant

## 2020-02-28 ENCOUNTER — RX RENEWAL (OUTPATIENT)
Age: 85
End: 2020-02-28

## 2020-02-29 ENCOUNTER — RX RENEWAL (OUTPATIENT)
Age: 85
End: 2020-02-29

## 2020-03-02 ENCOUNTER — RX RENEWAL (OUTPATIENT)
Age: 85
End: 2020-03-02

## 2020-04-06 ENCOUNTER — APPOINTMENT (OUTPATIENT)
Dept: INTERNAL MEDICINE | Facility: CLINIC | Age: 85
End: 2020-04-06
Payer: MEDICARE

## 2020-04-06 PROCEDURE — 99441: CPT

## 2020-04-17 ENCOUNTER — RX RENEWAL (OUTPATIENT)
Age: 85
End: 2020-04-17

## 2020-05-25 DIAGNOSIS — N39.0 URINARY TRACT INFECTION, SITE NOT SPECIFIED: ICD-10-CM

## 2020-05-28 ENCOUNTER — RX RENEWAL (OUTPATIENT)
Age: 85
End: 2020-05-28

## 2020-06-10 ENCOUNTER — RX RENEWAL (OUTPATIENT)
Age: 85
End: 2020-06-10

## 2020-06-17 ENCOUNTER — APPOINTMENT (OUTPATIENT)
Dept: INTERNAL MEDICINE | Facility: CLINIC | Age: 85
End: 2020-06-17

## 2020-06-25 ENCOUNTER — RX RENEWAL (OUTPATIENT)
Age: 85
End: 2020-06-25

## 2020-07-02 ENCOUNTER — LABORATORY RESULT (OUTPATIENT)
Age: 85
End: 2020-07-02

## 2020-07-02 ENCOUNTER — APPOINTMENT (OUTPATIENT)
Dept: INTERNAL MEDICINE | Facility: CLINIC | Age: 85
End: 2020-07-02
Payer: MEDICARE

## 2020-07-02 VITALS
SYSTOLIC BLOOD PRESSURE: 140 MMHG | WEIGHT: 97 LBS | TEMPERATURE: 98 F | DIASTOLIC BLOOD PRESSURE: 70 MMHG | BODY MASS INDEX: 22.45 KG/M2 | HEIGHT: 55 IN

## 2020-07-02 DIAGNOSIS — L03.116 CELLULITIS OF LEFT LOWER LIMB: ICD-10-CM

## 2020-07-02 DIAGNOSIS — R29.898 OTHER SYMPTOMS AND SIGNS INVOLVING THE MUSCULOSKELETAL SYSTEM: ICD-10-CM

## 2020-07-02 DIAGNOSIS — M81.0 AGE-RELATED OSTEOPOROSIS W/OUT CURRENT PATHOLOGICAL FRACTURE: ICD-10-CM

## 2020-07-02 PROCEDURE — 36415 COLL VENOUS BLD VENIPUNCTURE: CPT

## 2020-07-02 PROCEDURE — 99495 TRANSJ CARE MGMT MOD F2F 14D: CPT | Mod: 25

## 2020-07-02 RX ORDER — QUINAPRIL HYDROCHLORIDE 5 MG/1
5 TABLET, FILM COATED ORAL
Qty: 90 | Refills: 3 | Status: DISCONTINUED | COMMUNITY
Start: 2019-08-13 | End: 2020-07-02

## 2020-07-02 RX ORDER — ISOSORBIDE DINITRATE 5 MG/1
5 TABLET ORAL
Refills: 0 | Status: DISCONTINUED | COMMUNITY
End: 2020-07-02

## 2020-07-02 RX ORDER — NITROFURANTOIN MACROCRYSTALS 100 MG/1
100 CAPSULE ORAL
Qty: 14 | Refills: 0 | Status: DISCONTINUED | COMMUNITY
Start: 2020-05-25 | End: 2020-07-02

## 2020-07-03 PROBLEM — M81.0 OSTEOPOROSIS: Status: ACTIVE | Noted: 2020-02-19

## 2020-07-03 NOTE — HEALTH RISK ASSESSMENT
[No] : No [No falls in past year] : Patient reported no falls in the past year [Assistive Device] : Patient uses an assistive device [0] : 2) Feeling down, depressed, or hopeless: Not at all (0) [] : No [de-identified] : luis carlos

## 2020-07-03 NOTE — PHYSICAL EXAM
[26050 - Moderate Complexity requires multiple possible diagnoses and/or the management options, moderate complexity of the medical data (tests, etc.) to be reviewed, and moderate risk of significant complications, morbidity, and/or mortality as well as co] : Moderate Complexity [No Acute Distress] : no acute distress [Well Nourished] : well nourished [Normal Rate] : normal rate  [Normal S1, S2] : normal S1 and S2 [Normal Affect] : the affect was normal [Normal Mood] : the mood was normal [Normal Insight/Judgement] : insight and judgment were intact [de-identified] : reg at present, no murmur appreciated [de-identified] : stasis changes in her LE

## 2020-07-03 NOTE — REVIEW OF SYSTEMS
[Easy Bruising] : easy bruising [Negative] : Gastrointestinal [Easy Bleeding] : no easy bleeding [FreeTextEntry4] : hard of hearing

## 2020-07-03 NOTE — HISTORY OF PRESENT ILLNESS
[Post-hospitalization from ___ Hospital] : Post-hospitalization from [unfilled] Hospital [Admitted on: ___] : The patient was admitted on [unfilled] [Discharged on ___] : discharged on [unfilled] [Discharge Summary] : discharge summary [Pertinent Labs] : pertinent labs [Radiology Findings] : radiology findings [Discharge Med List] : discharge medication list [Med Reconciliation] : medication reconciliation has been completed [Patient Contacted By: ____] : and contacted by [unfilled] [FreeTextEntry2] : Pt was admitted to [facility] for pancytopenia. However she states she does not want further work up for it as she is feeling well and also due to her age.\par Pt has a sacral decubitus and has been using zinc ointment. She has [facility] services to check her b/p and for wound care. She currently has a patch on it. No fever and no pain in the region

## 2020-07-06 LAB
25(OH)D3 SERPL-MCNC: 51.5 NG/ML
ALBUMIN SERPL ELPH-MCNC: 3.7 G/DL
ALP BLD-CCNC: 84 U/L
ALT SERPL-CCNC: 8 U/L
ANION GAP SERPL CALC-SCNC: 12 MMOL/L
AST SERPL-CCNC: 14 U/L
BILIRUB SERPL-MCNC: 0.4 MG/DL
BUN SERPL-MCNC: 29 MG/DL
CALCIUM SERPL-MCNC: 9.2 MG/DL
CHLORIDE SERPL-SCNC: 103 MMOL/L
CHOLEST SERPL-MCNC: 121 MG/DL
CHOLEST/HDLC SERPL: 1.9 RATIO
CO2 SERPL-SCNC: 22 MMOL/L
CREAT SERPL-MCNC: 1.28 MG/DL
GLUCOSE SERPL-MCNC: 78 MG/DL
HDLC SERPL-MCNC: 62 MG/DL
LDLC SERPL CALC-MCNC: 48 MG/DL
POTASSIUM SERPL-SCNC: 4.7 MMOL/L
PROT SERPL-MCNC: 7.3 G/DL
SODIUM SERPL-SCNC: 137 MMOL/L
TRIGL SERPL-MCNC: 52 MG/DL
TSH SERPL-ACNC: 0.97 UIU/ML

## 2020-07-07 LAB
BASOPHILS # BLD AUTO: 0.04 K/UL
BASOPHILS NFR BLD AUTO: 2.3 %
EOSINOPHIL # BLD AUTO: 0.04 K/UL
EOSINOPHIL NFR BLD AUTO: 2.3 %
HCT VFR BLD CALC: 33.5 %
HGB BLD-MCNC: 10 G/DL
IMM GRANULOCYTES NFR BLD AUTO: 0 %
LYMPHOCYTES # BLD AUTO: 1.29 K/UL
LYMPHOCYTES NFR BLD AUTO: 72.9 %
MAN DIFF?: NORMAL
MCHC RBC-ENTMCNC: 27.2 PG
MCHC RBC-ENTMCNC: 29.9 GM/DL
MCV RBC AUTO: 91 FL
MONOCYTES # BLD AUTO: 0.28 K/UL
MONOCYTES NFR BLD AUTO: 15.8 %
NEUTROPHILS # BLD AUTO: 0.12 K/UL
NEUTROPHILS NFR BLD AUTO: 6.7 %
PLATELET # BLD AUTO: NORMAL K/UL
RBC # BLD: 3.68 M/UL
RBC # FLD: 15.2 %
WBC # FLD AUTO: 1.77 K/UL

## 2020-07-23 ENCOUNTER — RX RENEWAL (OUTPATIENT)
Age: 85
End: 2020-07-23

## 2020-08-06 ENCOUNTER — APPOINTMENT (OUTPATIENT)
Dept: INTERNAL MEDICINE | Facility: CLINIC | Age: 85
End: 2020-08-06
Payer: MEDICARE

## 2020-08-06 PROCEDURE — 99442: CPT | Mod: 95

## 2020-08-18 ENCOUNTER — APPOINTMENT (OUTPATIENT)
Dept: CARDIOLOGY | Facility: CLINIC | Age: 85
End: 2020-08-18

## 2020-08-19 ENCOUNTER — APPOINTMENT (OUTPATIENT)
Dept: INTERNAL MEDICINE | Facility: CLINIC | Age: 85
End: 2020-08-19
Payer: MEDICARE

## 2020-08-19 VITALS
TEMPERATURE: 97.4 F | SYSTOLIC BLOOD PRESSURE: 160 MMHG | WEIGHT: 96 LBS | DIASTOLIC BLOOD PRESSURE: 82 MMHG | BODY MASS INDEX: 22.21 KG/M2 | HEIGHT: 55 IN

## 2020-08-19 PROCEDURE — 99213 OFFICE O/P EST LOW 20 MIN: CPT

## 2020-08-19 NOTE — HISTORY OF PRESENT ILLNESS
[Formal Caregiver] : formal caregiver [FreeTextEntry1] : follow-up  [de-identified] : Pt here for f/u visit. She went to Urgent Care a few days ago, approx 2 weeks, and received antibiotics for a right hand cellulitis. Her hand is much improved. Redness and pain are gone. She is feeling well today She does not recall injuring her hand so does not know how it became infected. \par She is here with her aide today. Walking with her walker.\par Pt's aide is with her 7.5 hours on Tuesday- Fri. She has been with her for 3 months now. She lives alone.

## 2020-08-19 NOTE — PHYSICAL EXAM
[Normal] : no acute distress, well nourished, well developed and well-appearing [No Respiratory Distress] : no respiratory distress  [No Accessory Muscle Use] : no accessory muscle use [Normal Rate] : normal rate  [Clear to Auscultation] : lungs were clear to auscultation bilaterally [Normal S1, S2] : normal S1 and S2 [Normal Affect] : the affect was normal [Coordination Grossly Intact] : coordination grossly intact [Normal Mood] : the mood was normal [Alert and Oriented x3] : oriented to person, place, and time [Normal Insight/Judgement] : insight and judgment were intact [de-identified] : irreg [de-identified] : has on compression socks [de-identified] : thin skin, many moles [de-identified] : walks with walker

## 2020-08-19 NOTE — REVIEW OF SYSTEMS
[Easy Bruising] : easy bruising [Negative] : Psychiatric [Fever] : no fever [Easy Bleeding] : no easy bleeding [Swollen Glands] : no swollen glands [FreeTextEntry9] : chronic joint pains

## 2020-08-19 NOTE — HEALTH RISK ASSESSMENT
[No falls in past year] : Patient reported no falls in the past year [Assistive Device] : Patient uses an assistive device [No] : In the past 12 months have you used drugs other than those required for medical reasons? No [0] : 1) Little interest or pleasure doing things: Not at all (0) [de-identified] : none [de-identified] : none [] : No [de-identified] : luis carlos

## 2020-09-03 PROBLEM — Z87.898 HISTORY OF GAIT DISORDER: Status: RESOLVED | Noted: 2020-01-01 | Resolved: 2020-01-01

## 2020-09-03 PROBLEM — Z87.39 HISTORY OF OSTEOPOROSIS: Status: RESOLVED | Noted: 2020-01-01 | Resolved: 2020-01-01

## 2020-09-03 PROBLEM — Z87.19 HISTORY OF GASTROESOPHAGEAL REFLUX (GERD): Status: RESOLVED | Noted: 2020-01-01 | Resolved: 2020-01-01

## 2020-09-03 NOTE — HISTORY OF PRESENT ILLNESS
[FreeTextEntry1] : 89-year-old female\par Routine followup\par \par "I feel okay." Lorene denies chest pain, palpitations or syncope. No bleeding complications while taking apixaban.\par

## 2020-09-03 NOTE — PHYSICAL EXAM
[Normal Conjunctiva] : the conjunctiva exhibited no abnormalities [Auscultation Breath Sounds / Voice Sounds] : lungs were clear to auscultation bilaterally [Heart Sounds] : normal S1 and S2 [Bowel Sounds] : normal bowel sounds [Abdomen Soft] : soft [Abdomen Tenderness] : non-tender [Abnormal Walk] : normal gait [] : no rash [Affect] : the affect was normal [FreeTextEntry1] : Left ankle 2+ edema right ankle 1+ Toes slightly cyanotic No ulcerations

## 2020-09-03 NOTE — DISCUSSION/SUMMARY
[FreeTextEntry1] : Atrial fibrillation\par The working diagnosis is paroxysmal   atrial fibrillation secondary to hypertensive- valvular (mild-moderate mitral regurgitation 3/18 echocardiogram) and (probable) atherosclerotic heart disease. Adequate rate control of the ventricular response is  achieved with the present medical regimen. An elevated "[id number]" score is indicative of an increased risk of systemic/cerebral emboli. Factor Xa inhibitor therapy is indicated ..\par \par I have recommended the following:\par 1. Continue present medical regimen\par 2.. No further cardiac testing for this problem at this time.\par \par \par \par \par \par \par Valvular heart disease\par The 3/18 echocardiogram demonstrated marked calcification the mitral valve annulus with thickened leaflets. An echo density was seen somewhat mobile near the annulus and posterior leaflet. The possibility of a vegetation could not be ruled out. Mild-moderate mitral regurgitation was noted. The left atrium was enlarged at 4.9 cm. The left ventricular ejection fraction was 61%. The present cardiac physical examination is not suggestive of severe mitral regurgitation. In view of the absence of heart failure and clinical course continued monitoring without intervention is indicated at this time.\par Lorene was unable to tolerate followup  echocardiographic study\par \par I have recommended the following:\par 1. No further cardiac testing for this problem at this time.\par \par \par \par \par Hypertension\par Hypertension is controlled on the present medical regimen. In the setting of atrial fibrillation and probable atherosclerotic  heart disease beta blocker and ACE-I./ARB therapy are attractive. The dose of quinapril may be increased if required to maintain optimum levels.\par \par I have recommended the following:\par 1. Low salt low fat diet. Exercise to the extent possible\par 2 continue the present medical regimen\par 3 increase quinapril dose if required to maintain optimum levels as discussed above.\par \par \par \par \par Hyperlipidemia\par Hyperlipidemia represents a risk factor for the development of atherosclerotic heart disease. Although likely to be present, there is no objective evidence of such to date. An 8/05 Persantine sestamibi study was normal with an ejection fraction of 71%. The target LDL level for primary prevention is about 100.HMG coA reductase inhibitor therapy has been effective In  7/20  the serum cholesterol level was 121 triglycerides 52  HDL 62 and LDL 48. Consideration will be given towards a reduction in dose of atorvastatin dependent on followup lipid levels.Medical intervention in this patient's age group is controversial.  Nonpharmacological therapy, specifically diet and exercise to the extent possible are emphasized as  major aspects of treatment.\par \par I have recommended the following:\par 1. Low-salt low-fat diet. Exercise to the extent possible\par 2 continued present medical regimen\par 3. Laboratory studies including lipid profile through primary care Dr. Kerns\par 4. Target LDL level to about 100 as discussed above\par 5.Decrease atorvastatin dose dependent on followup lipid levels as discussed above.\par \par \par \par The diagnosis, prognosis risks and options were reviewed with the patient. All questions were answered. Issues discussed included anticoagulation, atrial fibrillation risk of stroke hypertension and hyperlipidemia. An elevated risk regarding medical decision making in this patient encounter was in part related to anticoagulation. The possibility of bleeding complications versus embolic stroke were  weighed carefully in this elderly patient. Treatment for atrial fibrillation with rate control strategy versus attempts at maintaining sinus rhythm were addressed.\par \par \par Counseling and/or coordination of care\par Time was a significant factor for this patient encounter. Total time spent with the patient was 25 minutes. 50% of the time was devoted to counseling and/or coordination of care.

## 2020-09-03 NOTE — REVIEW OF SYSTEMS
[Feeling Fatigued] : feeling fatigued [see HPI] : see HPI [Eyeglasses] : currently wearing eyeglasses [Shortness Of Breath] : shortness of breath [Joint Pain] : joint pain [Negative] : Heme/Lymph

## 2020-10-09 NOTE — REVIEW OF SYSTEMS
[Lower Ext Edema] : lower extremity edema [Dysuria] : dysuria [Incontinence] : incontinence [Frequency] : frequency [Fever] : no fever [Chest Pain] : no chest pain [Hematuria] : no hematuria [FreeTextEntry9] : no flank pain

## 2020-10-09 NOTE — HISTORY OF PRESENT ILLNESS
[Formal Caregiver] : formal caregiver [FreeTextEntry8] : uti \par Pt here for UTI symptoms. No fever, but feels burning when she urinates x 1 day.\par No flank pain.\par Also has LE edema chronically, but has not been using compression socks which does help.

## 2020-10-09 NOTE — HEALTH RISK ASSESSMENT
[No] : In the past 12 months have you used drugs other than those required for medical reasons? No [No falls in past year] : Patient reported no falls in the past year [Assistive Device] : Patient uses an assistive device [0] : 2) Feeling down, depressed, or hopeless: Not at all (0) [] : No [de-identified] : none [de-identified] : none [de-identified] : luis carlos

## 2020-10-09 NOTE — PHYSICAL EXAM
[Normal] : no acute distress, well nourished, well developed and well-appearing [No Respiratory Distress] : no respiratory distress  [No Accessory Muscle Use] : no accessory muscle use [Clear to Auscultation] : lungs were clear to auscultation bilaterally [Normal Rate] : normal rate  [Normal S1, S2] : normal S1 and S2 [Coordination Grossly Intact] : coordination grossly intact [No Focal Deficits] : no focal deficits [Normal Affect] : the affect was normal [Alert and Oriented x3] : oriented to person, place, and time [Normal Mood] : the mood was normal [Normal Insight/Judgement] : insight and judgment were intact [de-identified] : irreg [de-identified] : b/l LE edema [de-identified] : thin skin, many moles [de-identified] : walks with walker

## 2020-11-17 PROBLEM — D61.818 PANCYTOPENIA: Status: ACTIVE | Noted: 2020-07-02

## 2020-11-17 PROBLEM — M54.5 BACK PAIN, LUMBOSACRAL: Status: ACTIVE | Noted: 2019-08-19

## 2020-11-17 NOTE — HEALTH RISK ASSESSMENT
[No] : In the past 12 months have you used drugs other than those required for medical reasons? No [No falls in past year] : Patient reported no falls in the past year [Assistive Device] : Patient uses an assistive device [0] : 2) Feeling down, depressed, or hopeless: Not at all (0) [] : No [de-identified] : none [de-identified] : regular diet  [de-identified] : luis carlos

## 2020-11-17 NOTE — HISTORY OF PRESENT ILLNESS
[Formal Caregiver] : formal caregiver [FreeTextEntry1] : follow-up [de-identified] : Pt here for f/u visit. She is here with her aide. She has a sore right foot. Pt says that she has had the sore for a long time, not bothering her. She has a band aid over it and her compression socks on. \par Overall doing well. Not ill during COVID 19. She is mostly at home. Wears a mask when she needs to leave her home.

## 2020-11-17 NOTE — REVIEW OF SYSTEMS
[Lower Ext Edema] : lower extremity edema [Easy Bleeding] : easy bleeding [Easy Bruising] : easy bruising [Fever] : no fever [Chest Pain] : no chest pain

## 2020-11-17 NOTE — PHYSICAL EXAM
[Normal] : no respiratory distress, lungs were clear to auscultation bilaterally and no accessory muscle use [Normal Rate] : normal rate  [No Focal Deficits] : no focal deficits [Normal Affect] : the affect was normal [Normal Insight/Judgement] : insight and judgment were intact [Alert and Oriented x3] : oriented to person, place, and time [Normal Mood] : the mood was normal [de-identified] : irreg, no murmur hears [de-identified] : b/l LE , + compression socks [de-identified] : excoriation left forehead

## 2021-01-01 ENCOUNTER — APPOINTMENT (OUTPATIENT)
Dept: HEMATOLOGY ONCOLOGY | Facility: CLINIC | Age: 86
End: 2021-01-01

## 2021-01-01 ENCOUNTER — RX RENEWAL (OUTPATIENT)
Age: 86
End: 2021-01-01

## 2021-01-01 ENCOUNTER — NON-APPOINTMENT (OUTPATIENT)
Age: 86
End: 2021-01-01

## 2021-01-01 ENCOUNTER — APPOINTMENT (OUTPATIENT)
Dept: CARDIOLOGY | Facility: CLINIC | Age: 86
End: 2021-01-01
Payer: MEDICARE

## 2021-01-01 ENCOUNTER — APPOINTMENT (OUTPATIENT)
Dept: INTERNAL MEDICINE | Facility: CLINIC | Age: 86
End: 2021-01-01

## 2021-01-01 ENCOUNTER — APPOINTMENT (OUTPATIENT)
Dept: INTERNAL MEDICINE | Facility: CLINIC | Age: 86
End: 2021-01-01
Payer: MEDICARE

## 2021-01-01 ENCOUNTER — LABORATORY RESULT (OUTPATIENT)
Age: 86
End: 2021-01-01

## 2021-01-01 VITALS
TEMPERATURE: 96.6 F | WEIGHT: 108 LBS | BODY MASS INDEX: 25.1 KG/M2 | SYSTOLIC BLOOD PRESSURE: 132 MMHG | DIASTOLIC BLOOD PRESSURE: 78 MMHG

## 2021-01-01 VITALS
DIASTOLIC BLOOD PRESSURE: 72 MMHG | SYSTOLIC BLOOD PRESSURE: 118 MMHG | BODY MASS INDEX: 24.87 KG/M2 | WEIGHT: 107 LBS | TEMPERATURE: 97.7 F

## 2021-01-01 DIAGNOSIS — I38 ENDOCARDITIS, VALVE UNSPECIFIED: ICD-10-CM

## 2021-01-01 DIAGNOSIS — N18.4 CHRONIC KIDNEY DISEASE, STAGE 4 (SEVERE): ICD-10-CM

## 2021-01-01 DIAGNOSIS — Z86.39 PERSONAL HISTORY OF OTHER ENDOCRINE, NUTRITIONAL AND METABOLIC DISEASE: ICD-10-CM

## 2021-01-01 DIAGNOSIS — Z86.79 PERSONAL HISTORY OF OTHER DISEASES OF THE CIRCULATORY SYSTEM: ICD-10-CM

## 2021-01-01 DIAGNOSIS — N18.9 CHRONIC KIDNEY DISEASE, UNSPECIFIED: ICD-10-CM

## 2021-01-01 DIAGNOSIS — E03.9 HYPOTHYROIDISM, UNSPECIFIED: ICD-10-CM

## 2021-01-01 DIAGNOSIS — R39.9 UNSPECIFIED SYMPTOMS AND SIGNS INVOLVING THE GENITOURINARY SYSTEM: ICD-10-CM

## 2021-01-01 DIAGNOSIS — L98.9 DISORDER OF THE SKIN AND SUBCUTANEOUS TISSUE, UNSPECIFIED: ICD-10-CM

## 2021-01-01 DIAGNOSIS — E78.5 HYPERLIPIDEMIA, UNSPECIFIED: ICD-10-CM

## 2021-01-01 DIAGNOSIS — R26.89 OTHER ABNORMALITIES OF GAIT AND MOBILITY: ICD-10-CM

## 2021-01-01 DIAGNOSIS — L03.113 CELLULITIS OF RIGHT UPPER LIMB: ICD-10-CM

## 2021-01-01 DIAGNOSIS — M25.531 PAIN IN RIGHT WRIST: ICD-10-CM

## 2021-01-01 DIAGNOSIS — I48.0 PAROXYSMAL ATRIAL FIBRILLATION: ICD-10-CM

## 2021-01-01 DIAGNOSIS — I10 ESSENTIAL (PRIMARY) HYPERTENSION: ICD-10-CM

## 2021-01-01 DIAGNOSIS — Z23 ENCOUNTER FOR IMMUNIZATION: ICD-10-CM

## 2021-01-01 LAB
ALBUMIN SERPL ELPH-MCNC: 3.1 G/DL
ALP BLD-CCNC: 100 U/L
ALT SERPL-CCNC: 5 U/L
ANION GAP SERPL CALC-SCNC: 10 MMOL/L
AST SERPL-CCNC: 14 U/L
BASOPHILS # BLD AUTO: 0.02 K/UL
BASOPHILS NFR BLD AUTO: 1 %
BILIRUB SERPL-MCNC: 0.3 MG/DL
BUN SERPL-MCNC: 35 MG/DL
CALCIUM SERPL-MCNC: 9 MG/DL
CHLORIDE SERPL-SCNC: 102 MMOL/L
CHOLEST SERPL-MCNC: 105 MG/DL
CO2 SERPL-SCNC: 26 MMOL/L
CREAT SERPL-MCNC: 1.35 MG/DL
EOSINOPHIL # BLD AUTO: 0.03 K/UL
EOSINOPHIL NFR BLD AUTO: 1.5 %
GLUCOSE SERPL-MCNC: 142 MG/DL
HCT VFR BLD CALC: 32.7 %
HDLC SERPL-MCNC: 49 MG/DL
HGB BLD-MCNC: 9.7 G/DL
IMM GRANULOCYTES NFR BLD AUTO: 0 %
LDLC SERPL CALC-MCNC: 46 MG/DL
LYMPHOCYTES # BLD AUTO: 1.1 K/UL
LYMPHOCYTES NFR BLD AUTO: 56.7 %
MAN DIFF?: NORMAL
MCHC RBC-ENTMCNC: 26 PG
MCHC RBC-ENTMCNC: 29.7 GM/DL
MCV RBC AUTO: 87.7 FL
MONOCYTES # BLD AUTO: 0.36 K/UL
MONOCYTES NFR BLD AUTO: 18.6 %
NEUTROPHILS # BLD AUTO: 0.43 K/UL
NEUTROPHILS NFR BLD AUTO: 22.2 %
NONHDLC SERPL-MCNC: 56 MG/DL
PLATELET # BLD AUTO: NORMAL K/UL
POTASSIUM SERPL-SCNC: 4.9 MMOL/L
PROT SERPL-MCNC: 7.3 G/DL
RBC # BLD: 3.73 M/UL
RBC # FLD: 15.3 %
SODIUM SERPL-SCNC: 138 MMOL/L
TRIGL SERPL-MCNC: 50 MG/DL
WBC # FLD AUTO: 1.94 K/UL

## 2021-01-01 PROCEDURE — 99213 OFFICE O/P EST LOW 20 MIN: CPT

## 2021-01-01 PROCEDURE — 93000 ELECTROCARDIOGRAM COMPLETE: CPT

## 2021-01-01 PROCEDURE — 36415 COLL VENOUS BLD VENIPUNCTURE: CPT

## 2021-01-01 PROCEDURE — 99214 OFFICE O/P EST MOD 30 MIN: CPT | Mod: 25

## 2021-01-01 RX ORDER — FAMOTIDINE 20 MG/1
20 TABLET, FILM COATED ORAL DAILY
Qty: 90 | Refills: 3 | Status: ACTIVE | COMMUNITY
Start: 2019-08-13 | End: 1900-01-01

## 2021-01-01 RX ORDER — APIXABAN 2.5 MG/1
2.5 TABLET, FILM COATED ORAL
Qty: 180 | Refills: 0 | Status: ACTIVE | COMMUNITY
Start: 2019-12-19 | End: 1900-01-01

## 2021-01-01 RX ORDER — METOPROLOL SUCCINATE 25 MG/1
25 TABLET, EXTENDED RELEASE ORAL
Qty: 90 | Refills: 0 | Status: ACTIVE | COMMUNITY
Start: 2020-02-29 | End: 1900-01-01

## 2021-01-01 RX ORDER — GABAPENTIN 100 MG/1
100 CAPSULE ORAL
Qty: 540 | Refills: 1 | Status: ACTIVE | COMMUNITY
Start: 2020-06-25 | End: 1900-01-01

## 2021-01-01 RX ORDER — ATORVASTATIN CALCIUM 10 MG/1
10 TABLET, FILM COATED ORAL
Qty: 90 | Refills: 1 | Status: ACTIVE | COMMUNITY
Start: 2020-02-28 | End: 1900-01-01

## 2021-01-01 RX ORDER — LEVOTHYROXINE SODIUM 0.07 MG/1
75 TABLET ORAL
Qty: 90 | Refills: 1 | Status: ACTIVE | COMMUNITY
Start: 2020-02-28 | End: 1900-01-01

## 2021-01-01 RX ORDER — TRAMADOL HYDROCHLORIDE 50 MG/1
50 TABLET, COATED ORAL
Qty: 60 | Refills: 0 | Status: ACTIVE | COMMUNITY
Start: 1900-01-01 | End: 1900-01-01

## 2021-03-03 PROBLEM — N18.9 CHRONIC RENAL INSUFFICIENCY: Status: RESOLVED | Noted: 2020-02-19 | Resolved: 2021-01-01

## 2021-03-03 PROBLEM — Z86.79 HISTORY OF HEART VALVE ABNORMALITY: Status: RESOLVED | Noted: 2018-05-23 | Resolved: 2021-01-01

## 2021-03-04 PROBLEM — I38 VALVULAR HEART DISEASE: Status: ACTIVE | Noted: 2021-01-01

## 2021-03-04 NOTE — HISTORY OF PRESENT ILLNESS
[FreeTextEntry1] : 89-year-old female\par Routine followup\par "I feel okay. "  Lorene denies chest pain, palpitations, shortness of breath at rest or syncope. She as yet  has not received the Covid 19 vaccine

## 2021-03-04 NOTE — DISCUSSION/SUMMARY
[FreeTextEntry1] : Atrial fibrillation\par The working diagnosis is paroxysmal   atrial fibrillation secondary to hypertensive- valvular (mild-moderate mitral regurgitation 3/18 echocardiogram) and (probable) atherosclerotic heart disease. Adequate rate control of the ventricular response is  achieved with the present medical regimen. An elevated "[id number]" score is indicative of an increased risk of systemic/cerebral emboli. Factor Xa inhibitor therapy is indicated ..\par \par I have recommended the following:\par 1. Continue present medical regimen\par 2.. No further cardiac testing for this problem at this time.\par \par \par \par \par \par \par Valvular heart disease\par The 3/18 echocardiogram demonstrated marked calcification the mitral valve annulus with thickened leaflets. An echo density was seen somewhat mobile near the annulus and posterior leaflet. The possibility of a vegetation could not be ruled out. Mild-moderate mitral regurgitation was noted. The left atrium was enlarged at 4.9 cm. The left ventricular ejection fraction was 61%. The present cardiac physical examination is not suggestive of severe mitral regurgitation. In view of the absence of heart failure and clinical course continued monitoring without intervention is indicated at this time.\par Lorene was unable to tolerate followup  echocardiographic study\par \par I have recommended the following:\par 1. No further cardiac testing for this problem at this time.\par \par \par \par \par Hypertension\par Hypertension is controlled on the present medical regimen. In the setting of atrial fibrillation and probable atherosclerotic  heart disease beta blocker and ACE-I./ARB therapy are attractive. The dose of quinapril may be increased if required to maintain optimum levels.\par \par I have recommended the following:\par 1. Low salt low fat diet. Exercise to the extent possible\par 2 continue the present medical regimen\par 3 increase quinapril dose if required to maintain optimum levels as discussed above.\par \par \par \par \par Hyperlipidemia\par Hyperlipidemia represents a risk factor for the development of atherosclerotic heart disease. Although likely to be present, there is no objective evidence of such to date. An 8/05 Persantine sestamibi study was normal with an ejection fraction of 71%. The target LDL level for primary prevention is about 100.HMG coA reductase inhibitor therapy has been effective In  11/20   the serum cholesterol level was 114  triglycerides 68   HDL 57  and LDL 43. Consideration will be given towards a reduction in dose of atorvastatin dependent on followup lipid levels.Medical intervention in this patient's age group is controversial.  Nonpharmacological therapy, specifically diet and exercise to the extent possible are emphasized as  major aspects of treatment.\par \par I have recommended the following:\par 1. Low-salt low-fat diet. Exercise to the extent possible\par 2 continued present medical regimen\par 3. Laboratory studies including lipid profile through primary care Dr. Kerns\par 4. Target LDL level to about 100 as discussed above\par 5.Decrease atorvastatin dose dependent on followup lipid levels as discussed above.\par \par \par \par The diagnosis, prognosis risks and options were reviewed with the patient. All questions were answered. Issues discussed included anticoagulation, atrial fibrillation risk of stroke hypertension and hyperlipidemia. An elevated risk regarding medical decision making in this patient encounter was in part related to anticoagulation. The possibility of bleeding complications versus embolic stroke were  weighed carefully in this elderly patient. Treatment for atrial fibrillation with rate control strategy versus attempts at maintaining sinus rhythm were addressed.\par \par \par Counseling and/or coordination of care\par Time was a significant factor for this patient encounter. Total time spent with the patient was 25 minutes. 50% of the time was devoted to counseling and/or coordination of care.

## 2021-03-12 PROBLEM — N18.4 STAGE 4 CHRONIC KIDNEY DISEASE: Status: ACTIVE | Noted: 2020-01-09

## 2021-03-12 PROBLEM — Z86.39 HISTORY OF HYPERGLYCEMIA: Status: RESOLVED | Noted: 2020-01-01 | Resolved: 2021-01-01

## 2021-03-12 PROBLEM — Z23 ENCOUNTER FOR IMMUNIZATION: Status: RESOLVED | Noted: 2020-01-01 | Resolved: 2021-01-01

## 2021-03-12 PROBLEM — L98.9 SKIN LESIONS: Status: ACTIVE | Noted: 2021-01-01

## 2021-03-12 PROBLEM — I48.0 PAROXYSMAL ATRIAL FIBRILLATION: Status: ACTIVE | Noted: 2019-01-11

## 2021-03-12 PROBLEM — I10 HYPERTENSION: Status: ACTIVE | Noted: 2018-05-23

## 2021-03-12 PROBLEM — E78.5 HYPERLIPIDEMIA, UNSPECIFIED HYPERLIPIDEMIA TYPE: Status: ACTIVE | Noted: 2018-05-23

## 2021-03-12 PROBLEM — R26.89 POOR BALANCE: Status: ACTIVE | Noted: 2020-02-06

## 2021-03-12 PROBLEM — R39.9 UTI SYMPTOMS: Status: RESOLVED | Noted: 2020-01-01 | Resolved: 2021-01-01

## 2021-03-12 PROBLEM — L03.113 CELLULITIS OF RIGHT WRIST: Status: RESOLVED | Noted: 2020-08-19 | Resolved: 2021-01-01

## 2021-03-12 PROBLEM — E03.9 HYPOTHYROIDISM, UNSPECIFIED TYPE: Status: ACTIVE | Noted: 2018-05-23

## 2021-03-12 PROBLEM — M25.531 RIGHT WRIST PAIN: Status: RESOLVED | Noted: 2020-08-06 | Resolved: 2021-01-01

## 2021-03-12 NOTE — PHYSICAL EXAM
[No JVD] : no jugular venous distention [Supple] : supple [Normal Rate] : normal rate  [Normal S1, S2] : normal S1 and S2 [Coordination Grossly Intact] : coordination grossly intact [Normal Gait] : normal gait [Normal] : affect was normal and insight and judgment were intact [Alert and Oriented x3] : oriented to person, place, and time [Normal Mood] : the mood was normal [de-identified] : irreg [de-identified] : +  edema in b/l LE, + skin lesion on left ankle medially [de-identified] : many skin lesions

## 2021-03-12 NOTE — HISTORY OF PRESENT ILLNESS
[Formal Caregiver] : formal caregiver [FreeTextEntry1] : follow-up [de-identified] : Pt here for f/u visit.  Her aide is present with her. She states that overall she is doing well. She has followed with Dr Trimble. No recent falls. Pt is not very active overall but does move around her home. States that other than her aches, she is feeling well. \par She has several skin lesions, particularly one on her ankle. She has been reluctant to make an appt with derm during the pandemic.

## 2021-03-12 NOTE — HEALTH RISK ASSESSMENT
[No] : In the past 12 months have you used drugs other than those required for medical reasons? No [No falls in past year] : Patient reported no falls in the past year [Assistive Device] : Patient uses an assistive device [0] : 2) Feeling down, depressed, or hopeless: Not at all (0) [] : No [de-identified] : none [de-identified] : regular diet [de-identified] : luis carlos

## 2021-09-01 ENCOUNTER — TRANSCRIPTION ENCOUNTER (OUTPATIENT)
Age: 86
End: 2021-09-01

## 2021-09-03 ENCOUNTER — APPOINTMENT (OUTPATIENT)
Dept: CARDIOLOGY | Facility: CLINIC | Age: 86
End: 2021-09-03

## 2024-11-28 NOTE — COUNSELING
[facility] Hospitalist   Progress Note    Admitting Date and Time: 11/24/2024  9:58 AM  Admit Dx: Neck pain [M54.2]  Atrial fibrillation with RVR (HCC) [I48.91]  Acute pain of left shoulder [M25.512]  Atrial fibrillation, unspecified type (HCC) [I48.91]  Atrial fibrillation with rapid ventricular response (HCC) [I48.91]    Subjective:    Patient was admitted with Neck pain [M54.2]  Atrial fibrillation with RVR (HCC) [I48.91]  Acute pain of left shoulder [M25.512]  Atrial fibrillation, unspecified type (HCC) [I48.91]  Atrial fibrillation with rapid ventricular response (HCC) [I48.91]. Patient denies fever, chills, cp, sob, n/v.     metoprolol succinate  50 mg Oral Daily    lidocaine  1 patch TransDERmal Daily    sulfamethoxazole-trimethoprim  1 tablet Oral BID    miconazole   Topical BID    allopurinol  100 mg Oral Daily    aspirin  81 mg Oral Daily    atorvastatin  20 mg Oral Daily    apixaban  5 mg Oral BID    levothyroxine  150 mcg Oral Daily    sodium chloride flush  5-40 mL IntraVENous 2 times per day     tiZANidine, 4 mg, Q6H PRN  oxyCODONE, 5 mg, Q4H PRN   Or  oxyCODONE, 10 mg, Q4H PRN  HYDROmorphone, 0.25 mg, Q3H PRN   Or  HYDROmorphone, 0.5 mg, Q3H PRN  sodium chloride flush, 5-40 mL, PRN  sodium chloride, , PRN  potassium chloride, 40 mEq, PRN   Or  potassium alternative oral replacement, 40 mEq, PRN  magnesium sulfate, 2,000 mg, PRN  ondansetron, 4 mg, Q8H PRN   Or  ondansetron, 4 mg, Q6H PRN  melatonin, 3 mg, Nightly PRN  senna, 1 tablet, Daily PRN  aluminum & magnesium hydroxide-simethicone, 30 mL, Q6H PRN  acetaminophen, 650 mg, Q6H PRN   Or  acetaminophen, 650 mg, Q6H PRN  polyethylene glycol, 17 g, Daily PRN         Objective:    /62   Pulse 84   Temp 97.6 °F (36.4 °C) (Temporal)   Resp 18   Ht 1.651 m (5' 5\")   Wt (!) 147.6 kg (325 lb 6.4 oz)   SpO2 99%   BMI 54.15 kg/m²   Skin: warm and dry, no rash or erythema  Pulmonary/Chest: clear to auscultation bilaterally- no  [Fall prevention counseling provided] : Fall prevention counseling provided neb  Atrial fib continue med    Given ampac scores and pt tells me she has not been out of bed much, recommended pt go to sn[facility] for rehab to maintain functionality and in long run, maintain her independence. Pt not wanting to but did ask that she keep an open mind.     Pt has been treated for uti with bactrim. Pt had noted dysuria prior to abx and now improved. Despite culture saying that in vitro it was resistant, clinically she has improved. Elevated creat would appear to be from bactrim. Will stop bactrim and monitor creat          Electronically signed by Elbert Perez DO on 11/28/2024 at 5:30 PM
